# Patient Record
Sex: FEMALE | Race: BLACK OR AFRICAN AMERICAN | NOT HISPANIC OR LATINO | URBAN - METROPOLITAN AREA
[De-identification: names, ages, dates, MRNs, and addresses within clinical notes are randomized per-mention and may not be internally consistent; named-entity substitution may affect disease eponyms.]

---
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
## 2023-04-11 NOTE — CONSULT NOTE PEDS - CONSULT REQUESTED DATE/TIME
Myringotomy with Tube Placement  Post Op Discharge Instructions    Keep the ears free of water, except for chlorinated pool water. Place antibiotic drops as instructed. Tylenol or ibuprofen for pain as directed. Tonsillectomy with Adenoidectomy  Post Op Discharge Instructions    Drink plenty of water and soft food diet only. Utilize Tylenol with occasional dosing of Motrin for pain unless given prescription narcotics. If you suspect, dehydration, take patient to local emergency room or hospital where surgery was performed. If there is significant bleeding, return patient to hospital.     Contact Dr. Gamble Gip office at [phone] with any unusual fevers or bleeding issues or stiff neck symptoms .
27-Jan-2020 18:46
27-Jan-2020 19:32